# Patient Record
Sex: MALE | Race: OTHER
[De-identification: names, ages, dates, MRNs, and addresses within clinical notes are randomized per-mention and may not be internally consistent; named-entity substitution may affect disease eponyms.]

---

## 2017-02-22 ENCOUNTER — HOSPITAL ENCOUNTER (EMERGENCY)
Dept: HOSPITAL 62 - ER | Age: 32
Discharge: HOME | End: 2017-02-22
Payer: MEDICAID

## 2017-02-22 VITALS — SYSTOLIC BLOOD PRESSURE: 110 MMHG | DIASTOLIC BLOOD PRESSURE: 59 MMHG

## 2017-02-22 DIAGNOSIS — M25.561: ICD-10-CM

## 2017-02-22 DIAGNOSIS — G89.29: Primary | ICD-10-CM

## 2017-02-22 DIAGNOSIS — M25.562: ICD-10-CM

## 2017-02-22 PROCEDURE — 99283 EMERGENCY DEPT VISIT LOW MDM: CPT

## 2017-02-22 NOTE — ER DOCUMENT REPORT
ED Extremity Problem, Lower





- General


Chief Complaint: Knee Pain


Stated Complaint: KNEE PAIN


Mode of Arrival: Ambulatory


Information source: Patient


Notes: 


31-year-old male presents to the emergency department complaining of bilateral 

knee pain.  Patient reports history of chronic knee pain due to previous 

injuries multiple years ago and previous right knee surgery after ligament 

injury.  Reports pain is worse after prolonged activity or standing.  Denies 

swelling, redness, warmth, numbness, tingling, or color changes.


TRAVEL OUTSIDE OF THE [place] IN LAST 30 DAYS: No





- HPI


Patient complains to provider of: Pain


Location: Knee


Onset/Duration: Intermittent


Quality of pain: Achy


Severity: Moderate


Pain Level: 3


Recent injury: No


Exacerbated by: Movement, Walking


Relieved by: Ice, Rest





- Related Data


Allergies/Adverse Reactions: 


 





No Known Allergies Allergy (Verified 02/22/17 16:54)


 











Past Medical History





- General


Information source: Patient





- Social History


Smoking Status: Current Every Day Smoker


Chew tobacco use (# tins/day): No


Frequency of alcohol use: Occasional


Drug Abuse: None


Lives with: Family


Family History: Arthritis, CAD, CVA, DM, Hyperlipidemia, Hypertension


Patient has suicidal ideation: No


Patient has homicidal ideation: No


Renal/ Medical History: Denies: Hx Peritoneal Dialysis


Musculoskeltal Medical History: Reports Hx Arthritis, Reports Hx 

Musculoskeletal Deformity, Reports Hx Musculoskeletal Trauma


Past Surgical History: Reports: Hx Orthopedic Surgery - torn ACL repair





- Immunizations


Immunizations up to date: Yes


Hx Diphtheria, Pertussis, Tetanus Vaccination: Yes





Review of Systems





- Review of Systems


Constitutional: No symptoms reported


EENT: No symptoms reported


Cardiovascular: No symptoms reported


Respiratory: No symptoms reported


Gastrointestinal: No symptoms reported


Genitourinary: No symptoms reported


Male Genitourinary: No symptoms reported


Musculoskeletal: See HPI


Skin: No symptoms reported


Hematologic/Lymphatic: No symptoms reported


Neurological/Psychological: No symptoms reported


-: Yes All other systems reviewed and negative





Physical Exam





- Vital signs


Vitals: 


 











Temp Pulse Resp BP Pulse Ox


 


 97.8 F   89   18   128/76 H  96 


 


 02/22/17 16:48  02/22/17 16:48  02/22/17 16:48  02/22/17 16:48  02/22/17 16:48














- General


General appearance: Appears well, Alert


In distress: None





- HEENT


Head: Normocephalic, Atraumatic


Eyes: Normal





- Respiratory


Respiratory status: No respiratory distress


Chest status: Nontender


Breath sounds: Normal


Chest palpation: Normal





- Cardiovascular


Rhythm: Regular


Pulses: Normal: Radial, Posterior tibial, Dorsalis pedis


Normal capillary refill: Yes





- Extremities


General upper extremity: Normal inspection, Nontender, Normal color, Normal ROM

, Normal strength, Normal temperature.  No: Tender, Edema


General lower extremity: Normal inspection, Nontender, Normal color, Normal ROM

, Normal strength, Normal temperature, Normal weight bearing.  No: Tender, Edema

, Catarina's sign


Knee: Normal, Other - Old well-healed midline anterior surgical scar to right 

knee.  No swelling, erythema, or warmth.  Motor and neurovascular function 

intact..  No: Tender, Deformity, Dislocation, Pain with ROM





Course





- Re-evaluation


Re-evalutation: 





02/22/17 21:51


Patient hemodynamically stable, in no distress.  Physical exam unremarkable 

with no suggestion of acute injury, or emergent infectious, inflammatory, or 

vascular etiology for the pain. Pt appears stable for discharge and agrees with 

home care, follow-up with PCP and orthopedics, and ED return precautions.





- Vital Signs


Vital signs: 


 











Temp Pulse Resp BP Pulse Ox


 


 98.4 F   86   20   110/59 L  98 


 


 02/22/17 21:28  02/22/17 21:28  02/22/17 21:28  02/22/17 21:28  02/22/17 21:28














Discharge





- Discharge


Clinical Impression: 


Chronic knee pain


Qualifiers:


 Laterality: bilateral Qualified Code(s): M25.561 - Pain in right knee





Condition: Stable


Disposition: HOME, SELF-CARE


Instructions:  Knee Exercise Program ([facility]), Anti-Inflammatory Medication ([facility]), 

Ice Packs ([facility]), Chronic Pain Control ([facility])


Additional Instructions: 


Follow-up with your primary care provider and orthopedics as discussed.


Return to the emergency department for any worsening symptoms or concerns.


Prescriptions: 


Naproxen 500 mg PO BIDP PRN #10 tablet


 PRN Reason: 


Forms:  Return to Work


Referrals: 


JERRY RICHARDS MD [ACTIVE STAFF] - Follow up in 3-5 days

## 2017-02-22 NOTE — ER DOCUMENT REPORT
ED Medical Screen (RME)





- General


Stated Complaint: KNEE PAIN


Notes: 


30 yo male c/o bilat knee pain.  chronic knee pain.  ACL injury in .  

no recent injury.  


TRAVEL OUTSIDE OF THE [place] IN LAST 30 DAYS: No





- Related Data


Allergies/Adverse Reactions: 


 





No Known Allergies Allergy (Unverified 03/19/15 19:58)


 











Past Medical History


Musculoskeltal Medical History: Reports Hx Arthritis, Reports Hx 

Musculoskeletal Deformity, Reports Hx Musculoskeletal Trauma


Past Surgical History: Reports: Hx Orthopedic Surgery - torn ACL repair





- Immunizations


Immunizations up to date: Yes


Hx Diphtheria, Pertussis, Tetanus Vaccination: Yes

## 2017-11-14 ENCOUNTER — HOSPITAL ENCOUNTER (EMERGENCY)
Dept: HOSPITAL 62 - ER | Age: 32
Discharge: HOME | End: 2017-11-14
Payer: SELF-PAY

## 2017-11-14 VITALS — SYSTOLIC BLOOD PRESSURE: 119 MMHG | DIASTOLIC BLOOD PRESSURE: 76 MMHG

## 2017-11-14 DIAGNOSIS — R43.8: ICD-10-CM

## 2017-11-14 DIAGNOSIS — R51: ICD-10-CM

## 2017-11-14 DIAGNOSIS — R42: Primary | ICD-10-CM

## 2017-11-14 DIAGNOSIS — Z87.891: ICD-10-CM

## 2017-11-14 LAB
ALBUMIN SERPL-MCNC: 4.2 G/DL (ref 3.5–5)
ALP SERPL-CCNC: 130 U/L (ref 38–126)
ALT SERPL-CCNC: 80 U/L (ref 21–72)
ANION GAP SERPL CALC-SCNC: 11 MMOL/L (ref 5–19)
AST SERPL-CCNC: 42 U/L (ref 17–59)
BASOPHILS # BLD AUTO: 0.1 10^3/UL (ref 0–0.2)
BASOPHILS NFR BLD AUTO: 0.5 % (ref 0–2)
BILIRUB DIRECT SERPL-MCNC: 0.4 MG/DL (ref 0–0.4)
BILIRUB SERPL-MCNC: 0.7 MG/DL (ref 0.2–1.3)
BUN SERPL-MCNC: 12 MG/DL (ref 7–20)
CALCIUM: 9.5 MG/DL (ref 8.4–10.2)
CHLORIDE SERPL-SCNC: 106 MMOL/L (ref 98–107)
CK MB SERPL-MCNC: 2.51 NG/ML (ref ?–4.55)
CK SERPL-CCNC: 240 U/L (ref 55–170)
CO2 SERPL-SCNC: 24 MMOL/L (ref 22–30)
CREAT SERPL-MCNC: 0.88 MG/DL (ref 0.52–1.25)
EOSINOPHIL # BLD AUTO: 0.7 10^3/UL (ref 0–0.6)
EOSINOPHIL NFR BLD AUTO: 7 % (ref 0–6)
ERYTHROCYTE [DISTWIDTH] IN BLOOD BY AUTOMATED COUNT: 12.5 % (ref 11.5–14)
GLUCOSE SERPL-MCNC: 101 MG/DL (ref 75–110)
HCT VFR BLD CALC: 42.8 % (ref 37.9–51)
HGB BLD-MCNC: 15.2 G/DL (ref 13.5–17)
HGB HCT DIFFERENCE: 2.8
LYMPHOCYTES # BLD AUTO: 2.2 10^3/UL (ref 0.5–4.7)
LYMPHOCYTES NFR BLD AUTO: 23.4 % (ref 13–45)
MCH RBC QN AUTO: 31.8 PG (ref 27–33.4)
MCHC RBC AUTO-ENTMCNC: 35.5 G/DL (ref 32–36)
MCV RBC AUTO: 90 FL (ref 80–97)
MONOCYTES # BLD AUTO: 0.6 10^3/UL (ref 0.1–1.4)
MONOCYTES NFR BLD AUTO: 6.8 % (ref 3–13)
NEUTROPHILS # BLD AUTO: 5.9 10^3/UL (ref 1.7–8.2)
NEUTS SEG NFR BLD AUTO: 62.3 % (ref 42–78)
POTASSIUM SERPL-SCNC: 4.2 MMOL/L (ref 3.6–5)
PROT SERPL-MCNC: 6.8 G/DL (ref 6.3–8.2)
RBC # BLD AUTO: 4.77 10^6/UL (ref 4.35–5.55)
SODIUM SERPL-SCNC: 141.4 MMOL/L (ref 137–145)
TROPONIN I SERPL-MCNC: < 0.012 NG/ML
WBC # BLD AUTO: 9.5 10^3/UL (ref 4–10.5)

## 2017-11-14 PROCEDURE — 84484 ASSAY OF TROPONIN QUANT: CPT

## 2017-11-14 PROCEDURE — 93010 ELECTROCARDIOGRAM REPORT: CPT

## 2017-11-14 PROCEDURE — 36415 COLL VENOUS BLD VENIPUNCTURE: CPT

## 2017-11-14 PROCEDURE — 85025 COMPLETE CBC W/AUTO DIFF WBC: CPT

## 2017-11-14 PROCEDURE — 99284 EMERGENCY DEPT VISIT MOD MDM: CPT

## 2017-11-14 PROCEDURE — 80053 COMPREHEN METABOLIC PANEL: CPT

## 2017-11-14 PROCEDURE — 82553 CREATINE MB FRACTION: CPT

## 2017-11-14 PROCEDURE — 96360 HYDRATION IV INFUSION INIT: CPT

## 2017-11-14 PROCEDURE — 93005 ELECTROCARDIOGRAM TRACING: CPT

## 2017-11-14 PROCEDURE — 82962 GLUCOSE BLOOD TEST: CPT

## 2017-11-14 PROCEDURE — 82550 ASSAY OF CK (CPK): CPT

## 2017-11-14 PROCEDURE — 71010: CPT

## 2017-11-14 NOTE — ER DOCUMENT REPORT
ED Blood Pressure Problem





- General


Mode of Arrival: Ambulatory


Information source: Patient


TRAVEL OUTSIDE OF THE [place] IN LAST 30 DAYS: No





<SHARON AGUIAR - Last Filed: 11/14/17 07:32>





<ARIADNA MANCIA - Last Filed: 11/14/17 08:31>





- General


Chief Complaint: Blood Pressure Problem


Stated Complaint: BLOOD PRESSURE PROBLEM


Time Seen by Provider: 11/14/17 06:28


Notes: 





Patient is a 32 year old male that presents to the emergency department today 

with complaints of a "taste of blood in his mouth".  Patient states that he 

feels like he has been "hanging upside down for too long".  Patient states that 

he usually notices this sensation immediately after standing.  Patient states 

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  Patient states he 

has a headache but denies any chest pain. (SHARON AGUIAR)





- Related Data


Allergies/Adverse Reactions: 


 





No Known Allergies Allergy (Verified 02/22/17 16:54)


 








Home Medications: 


 Current Home Medications





No Home Medications  11/14/17 [History]











Past Medical History





- General


Information source: Patient





- Social History


Smoking Status: Current Some Day Smoker - "quit two days ago"


Cigarette use (# per day): Yes


Chew tobacco use (# tins/day): No


Frequency of alcohol use: None


Drug Abuse: None


Lives with: Family


Family History: Arthritis, CAD, CVA, DM, Hyperlipidemia, Hypertension


Patient has suicidal ideation: No


Patient has homicidal ideation: No


Musculoskeltal Medical History: Reports Hx Arthritis, Reports Hx 

Musculoskeletal Deformity, Reports Hx Musculoskeletal Trauma


Past Surgical History: Reports: Hx Orthopedic Surgery - torn ACL repair





- Immunizations


Immunizations up to date: Yes


Hx Diphtheria, Pertussis, Tetanus Vaccination: Yes





<SHARON AGUIAR - Last Filed: 11/14/17 07:32>





Review of Systems





- Review of Systems


Constitutional: No symptoms reported


EENT: No symptoms reported


Cardiovascular: See HPI, Dizziness, Lightheaded.  denies: Chest pain


Respiratory: No symptoms reported


Gastrointestinal: No symptoms reported


Genitourinary: No symptoms reported


Male Genitourinary: No symptoms reported


Musculoskeletal: No symptoms reported


Skin: No symptoms reported


Hematologic/Lymphatic: No symptoms reported


Neurological/Psychological: See HPI, Headaches


-: Yes All other systems reviewed and negative





<SHARON AGUIAR - Last Filed: 11/14/17 07:32>





Physical Exam





<SHARON AGUIAR - Last Filed: 11/14/17 07:32>





- Vital signs


Interpretation: Normal





- General


General appearance: Appears well, Alert





- HEENT


Head: Normocephalic, Atraumatic


Eyes: Normal


Pupils: PERRL





- Respiratory


Respiratory status: No respiratory distress


Chest status: Nontender


Breath sounds: Normal


Chest palpation: Normal





- Cardiovascular


Rhythm: Regular


Heart sounds: Normal auscultation


Murmur: No





- Abdominal


Inspection: Normal


Distension: No distension


Bowel sounds: Normal


Tenderness: Nontender


Organomegaly: No organomegaly





- Back


Back: Normal, Nontender





- Extremities


General upper extremity: Normal inspection, Nontender, Normal color, Normal ROM

, Normal temperature


General lower extremity: Normal inspection, Nontender, Normal color, Normal ROM

, Normal temperature, Normal weight bearing.  No: Catarina's sign





- Neurological


Neuro grossly intact: Yes


Cognition: Normal


Orientation: AAOx4


[place] Coma Scale Eye Opening: Spontaneous


[place] Coma Scale Verbal: Oriented


[place] Coma Scale Motor: Obeys Commands


[place] Coma Scale Total: 15


Speech: Normal


Motor strength normal: LUE, RUE, LLE, RLE


Sensory: Normal





- Psychological


Associated symptoms: Normal affect, Normal mood





- Skin


Skin Temperature: Warm


Skin Moisture: Dry


Skin Color: Normal





<ARIADNA MANCIA - Last Filed: 11/14/17 08:31>





- Vital signs


Vitals: 





 











Temp Pulse Resp BP Pulse Ox


 


 98.2 F   92   20   147/85 H  97 


 


 11/14/17 06:10  11/14/17 06:10  11/14/17 06:10  11/14/17 06:10  11/14/17 06:10














- Notes


Notes: 





Physical Exam:


 


General: Alert, appears well. 


 


HEENT: Normocephalic. Atraumatic. PERRL. Extraocular movements intact. 

Oropharynx clear.


 


Neck: Supple. Non-tender.


 


Respiratory: No respiratory distress. Clear and equal breath sounds bilaterally.


 


Cardiovascular: Regular rate and rhythm. 


 


Abdominal: Normal Inspection. Non-tender. No distension. Normal Bowel Sounds. 


 


Back: Non-tender. No deformity or step off.


 


Extremities: Moves all four extremities.


Upper extremities: Normal inspection. Normal ROM.  


Lower extremities: Normal inspection. No edema. Normal ROM.


 


Neurological: Normal cognition. AAOx4. Normal speech.  


 


Psychological: Normal affect. Normal Mood. 


 


Skin: Warm. Dry. Normal color. (SHARON AGUIAR)





Course





- Laboratory


Result Diagrams: 


 11/14/17 06:45





 11/14/17 06:45





<SHARON AGUIAR - Last Filed: 11/14/17 07:32>





- Laboratory


Result Diagrams: 


 11/14/17 06:45





 11/14/17 06:45





<ARIADNA MANCIA - Last Filed: 11/14/17 08:31>





- Re-evaluation


Re-evalutation: 





11/14/17 08:28


Patient is a 32-year-old male who comes in complaining of head rashes.  Patient 

states that when he stands up he feels like the blood rushes and he was moving 

heavy things and then felt the same way.  Patient feels better after fluids.  

No acute finding on blood work.  No acute findings on chest x-ray.  Troponin is 

negative.  No acute changes on EKG.  Patient is to stay hydrated.  Stable for 

discharge.  Follow-up with PMD as needed. (ARIADNA MANCIA)





- Vital Signs


Vital signs: 





 











Temp Pulse Resp BP Pulse Ox


 


 98.2 F   92   21 H  121/87 H  95 


 


 11/14/17 06:10  11/14/17 06:10  11/14/17 07:01  11/14/17 07:01  11/14/17 07:01














- Laboratory


Laboratory results interpreted by me: 





 











  11/14/17 11/14/17





  06:45 06:45


 


Eosinophils %  7.0 H 


 


Absolute Eosinophils  0.7 H 


 


ALT   80 H


 


Alkaline Phosphatase   130 H


 


Creatine Kinase   240 H














Discharge





<SHARON AGUIAR - Last Filed: 11/14/17 07:32>





<ARIADNA MANCIA - Last Filed: 11/14/17 08:31>





- Discharge


Clinical Impression: 


 Orthostatic dizziness





Condition: Stable


Disposition: HOME, SELF-CARE


Instructions:  Orthostatic Hypotension ([facility]), Dehydration ([facility])


Additional Instructions: 


Please make sure you are staying hydrated.  Please follow-up with your doctor 

within the next week.


Forms:  Return to Work


Scribe Attestation: 





11/14/17 08:31


I personally performed the services described in the documentation, reviewed 

and edited the documentation which was dictated to the scribe in my presence, 

and it accurately records my words and actions. (ARIADNA MANCIA)





Scribe Documentation





- Scribe


Written by Christiano:: Christiano Alatorre, 11/14/2017 0732


acting as scribe for :: Cher





<SHARON AGUIAR - Last Filed: 11/14/17 07:32>

## 2017-11-14 NOTE — RADIOLOGY REPORT (SQ)
EXAM DESCRIPTION: CHEST SINGLE VIEW



CLINICAL HISTORY: CP



COMPARISON: 3/19/2015



FINDINGS: Single frontal view of the chest.  The

cardiomediastinal silhouette has normal size and contour. No

consolidation, pneumothorax, or pleural effusion.  No displaced

rib fractures identified. Leads overlie the chest.  Upper

abdominal soft tissues are unremarkable.



IMPRESSION:



1. No acute pulmonary process identified.

## 2020-03-29 ENCOUNTER — HOSPITAL ENCOUNTER (EMERGENCY)
Dept: HOSPITAL 62 - ER | Age: 35
LOS: 1 days | Discharge: HOME | End: 2020-03-30
Payer: COMMERCIAL

## 2020-03-29 DIAGNOSIS — F17.200: ICD-10-CM

## 2020-03-29 DIAGNOSIS — F41.9: ICD-10-CM
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
## 2020-03-29 DIAGNOSIS — S91.011A: Primary | ICD-10-CM

## 2020-03-29 PROCEDURE — 73600 X-RAY EXAM OF ANKLE: CPT

## 2020-03-29 PROCEDURE — 12002 RPR S/N/AX/GEN/TRNK2.6-7.5CM: CPT

## 2020-03-29 PROCEDURE — 99283 EMERGENCY DEPT VISIT LOW MDM: CPT

## 2020-03-30 VITALS — DIASTOLIC BLOOD PRESSURE: 92 MMHG | SYSTOLIC BLOOD PRESSURE: 122 MMHG

## 2020-03-30 NOTE — ER DOCUMENT REPORT
ED Wound





- General


Chief Complaint: Laceration


Stated Complaint: RIGHT LEG WOUND


Time Seen by Provider: 03/30/20 00:24


Notes: 


Patient is a 34-year-old male that comes to the emergency department for chief 

complaint of laceration to the right inner ankle area.  Patient states that he 

was working on the roof when a sheet of metal slid from a pile and caught him in

the ankle causing a laceration with heavy bleeding.  He states that he cleaned t

his thoroughly, trying to put liquid Band-Aid over it but it continued to bleed 

through this.  He states he is up-to-date on his tetanus within 5 years.  He 

denies any other injuries or any other complaints.  He denies any daily 

medications, denies alcohol tonight.





TRAVEL OUTSIDE OF THE [place] IN LAST 30 DAYS: No





- Related Data


Allergies/Adverse Reactions: 


                                        





No Known Allergies Allergy (Verified 02/22/17 16:54)


   











Past Medical History





- General


Information source: Patient





- Social History


Smoking Status: Current Every Day Smoker


Chew tobacco use (# tins/day): No


Frequency of alcohol use: None


Drug Abuse: None


Lives with: Family


Family History: Arthritis, CAD, CVA, DM, Hyperlipidemia, Hypertension


Patient has suicidal ideation: No


Patient has homicidal ideation: No


Renal/ Medical History: Denies: Hx Peritoneal Dialysis


Musculoskeletal Medical History: Reports Hx Arthritis, Reports Hx 

Musculoskeletal Deformity, Reports Hx Musculoskeletal Trauma


Past Surgical History: Reports: Hx Orthopedic Surgery - torn ACL repair





- Immunizations


Immunizations up to date: Yes


Hx Diphtheria, Pertussis, Tetanus Vaccination: Yes





Review of Systems





- Review of Systems


Constitutional: No symptoms reported


EENT: No symptoms reported


Cardiovascular: No symptoms reported


Respiratory: No symptoms reported


Gastrointestinal: No symptoms reported


Genitourinary: No symptoms reported


Male Genitourinary: No symptoms reported


Musculoskeletal: See HPI


Skin: See HPI


Hematologic/Lymphatic: No symptoms reported


Neurological/Psychological: No symptoms reported





Physical Exam





- Vital signs


Vitals: 


                                        











Temp Pulse Resp BP Pulse Ox


 


 97.8 F   98   18   144/83 H  96 


 


 03/29/20 21:11  03/29/20 21:11  03/29/20 21:11  03/29/20 21:11  03/29/20 21:11














- Notes


Notes: 


GENERAL: Alert, interacts well. No acute distress.


HEAD: Normocephalic, atraumatic.


EYES: Pupils equal, round, and reactive to light. Extraocular movements intact.


ENT: Oral mucosa moist, tongue midline. Oropharynx unremarkable. Airway patent.


LUNGS: Clear to auscultation bilaterally, no wheezes, rales, or rhonchi. No 

respiratory distress.


HEART: Regular rate and rhythm. No murmur


ABDOMEN: Soft, non-tender. Non-distended. 


EXTREMITIES: Right distal medial tibial area with a 4 cm linear partial-

thickness laceration.  This is easily explored, this is not down into the 

muscle, there is normal strength in the distal ankle and full range of motion, 

normal distal neurovascular exam, normal lower extremity exam otherwise.


BACK: no cervical, thoracic, lumbar midline tenderness. No saddle anesthesia, 

normal distal neurovascular exam. 


NEUROLOGICAL: Alert and oriented x3. Normal speech. Cranial nerves II through 

XII grossly intact. 


PSYCH: Normal affect, normal mood.


SKIN: Warm, dry, normal turgor. No rashes or lesions noted.








Course





- Re-evaluation


Re-evalutation: 


Patient was extremely anxious about the procedure, I gave him Valium after 

discussion, after this patient was calm and cooperative.  Patient was very 

appreciative for this.





X-rays negative without fracture or foreign body.  When I cleaned the wound the 

glue the patient had placed easily flaked off.  The area was irrigated 

thoroughly, sutured, dressed.  Discussed wound care, follow-up, return cautions.

 Patient states appreciation and agreement.








- Vital Signs


Vital signs: 


                                        











Temp Pulse Resp BP Pulse Ox


 


 97.3 F   59 L  18   122/92 H  95 


 


 03/30/20 02:59  03/30/20 02:59  03/30/20 02:59  03/30/20 02:59  03/30/20 02:59














Procedures





- Laceration/Wound Repair


  ** Right medial ankle


Wound length (cm): 4


Wound's Depth, Shape: Linear


Laceration pre-procedure: Sterile PPE donned, Sterile drapes applied, Shur-Clens

applied


Anesthetic type: 1% Lidocaine w/epi


Volume Anesthetic (mLs): 6


Wound explored: Clean, No foreign body removed


Irrigated w/ Saline (mLs): 70


Wound Repaired With: Sutures


Suture Size/Type: 4:0, Ethilon


Number of Sutures: 8


Layer Closure?: No


Post-procedure wound care: Sterile dressing applied


Post-procedure NV exam normal: Yes


Complications: No





Discharge





- Discharge


Clinical Impression: 


Laceration of ankle


Qualifiers:


 Encounter type: initial encounter Laterality: right Qualified Code(s): S91.011A

- Laceration without foreign body, right ankle, initial encounter





Condition: Stable


Disposition: HOME, SELF-CARE


Additional Instructions: 


Your x-ray does not show any concerning findings.


The sutures need to be removed in approximately 7 days.  This can be performed 

at almost any medical facility.





Keep the wound clean, clean with soap and water, dab dry, keep a protective 

dressing over the area, keep a thin film of topical antibiotic over the area.  

Avoid soaking or scrubbing the area.  You can take Tylenol and ibuprofen 

together every 6 hours if needed for pain.  I recommend elevating your leg for 

the first 2 days as much as possible.





Return for any signs of infection including developing pain, redness, swelling, 

discolored drainage, or any other concerning symptoms.


Forms:  Return to Work

## 2020-03-30 NOTE — RADIOLOGY REPORT ([facility])
CLINICAL INDICATION: wound, swelling. .



TECHNIQUE: 2 view(s) were obtained of the right ankle.



COMPARISON: None.



FINDINGS:



No acute displaced fracture is identified of the ankle. 

Alignment appears anatomic.  Joint spaces are within normal

limits for age.  Soft tissue swelling.



IMPRESSION: 

No evidence of acute displaced fracture of the ankle.